# Patient Record
Sex: FEMALE | Race: WHITE
[De-identification: names, ages, dates, MRNs, and addresses within clinical notes are randomized per-mention and may not be internally consistent; named-entity substitution may affect disease eponyms.]

---

## 2023-05-02 ENCOUNTER — HOSPITAL ENCOUNTER (EMERGENCY)
Dept: HOSPITAL 47 - EC | Age: 68
Discharge: HOME | End: 2023-05-02
Payer: MEDICARE

## 2023-05-02 VITALS — HEART RATE: 47 BPM | SYSTOLIC BLOOD PRESSURE: 125 MMHG | TEMPERATURE: 96.4 F | DIASTOLIC BLOOD PRESSURE: 58 MMHG

## 2023-05-02 VITALS — RESPIRATION RATE: 18 BRPM

## 2023-05-02 DIAGNOSIS — M79.604: Primary | ICD-10-CM

## 2023-05-02 PROCEDURE — 73502 X-RAY EXAM HIP UNI 2-3 VIEWS: CPT

## 2023-05-02 PROCEDURE — 99283 EMERGENCY DEPT VISIT LOW MDM: CPT

## 2023-05-02 NOTE — XR
EXAMINATION TYPE: XR knee complete RT

 

DATE OF EXAM: 5/2/2023

 

CLINICAL HISTORY: Pain.

 

TECHNIQUE:  Three views of the right knee are obtained.

 

COMPARISON: None.

 

FINDINGS:  There is no acute fracture/dislocation evident in right knee. Mild-to-moderate tricompartm
ent joint space loss without significant spurring. Mild to moderate posterior arteriovascular calcifi
cation is incidentally noted.  

 

IMPRESSION: As above.

## 2023-05-02 NOTE — XR
EXAMINATION TYPE: XR Hip Complete RT

 

DATE OF EXAM: 5/2/2023

 

CLINICAL HISTORY: Pain.

 

TECHNIQUE:  AP and frogleg views of the right hip are obtained.

 

COMPARISON: None.

 

FINDINGS:  There is no acute fracture/dislocation evident in the right hip. Mild axial joint space lo
ss. Femoral head shape maintained. Mild arterial calcification right groin and pelvic region incident
ally noted.  

 

IMPRESSION: As above. No

## 2023-05-02 NOTE — ED
General Adult HPI





- General


Chief complaint: Extremity Problem,Nontraumatic


Stated complaint: pain in right


Time Seen by Provider: 05/02/23 03:46


Source: patient, family


Mode of arrival: wheelchair





- History of Present Illness


Initial comments: 


Dictation was produced using dragon dictation software. please excuse any 

grammatical, word or spelling errors. 











Chief Complaint: 68-year-old female presents with right lower extremity pain





History of Present Illness: 68-year-old female she states that she has a history

of arthritis.  Patient states that over the last couple a she's been dealing 

with lower extremity pain.  States that yesterday her symptoms seem to be in 

both legs.  She went to bed tonight and felt like she was having mostly right 

lower extremity pain.  States that the pain seems to be in her whole right leg. 

She feels like it, from the knee radiates upwards and also comes from the hip 

and radiates downwards.  Patient took some Motrin and her symptoms improved.  

Patient has any difficulty ambulating.  Denies any numbness in her right lower 

extremity.  Denies any calf tenderness, popliteal tenderness or medial thigh 

pain.  Denies any rash or skin changes.








The ROS documented in this emergency department record has been reviewed and 

confirmed by me.  Those systems with pertinent positive or negative responses 

have been documented in the HPI.  All other systems are other negative and/or 

noncontributory.

















- Related Data


                                    Allergies











Allergy/AdvReac Type Severity Reaction Status Date / Time


 


No Known Allergies Allergy   Verified 05/02/23 04:41














Review of Systems


ROS Statement: 


Those systems with pertinent positive or pertinent negative responses have been 

documented in the HPI.





ROS Other: All systems not noted in ROS Statement are negative.





Past Medical History


Additional Past Medical History / Comment(s): parkinsons.  neuropathy


History of Any Multi-Drug Resistant Organisms: None Reported


Past Surgical History: No Surgical Hx Reported


Past Psychological History: No Psychological Hx Reported


Smoking Status: Never smoker


Past Alcohol Use History: None Reported


Past Drug Use History: None Reported





General Exam





- General Exam Comments


Initial Comments: 











PHYSICAL EXAM:


General Impression: Alert and oriented x3, not in acute distress


HEENT: Normocephalic atraumatic, extra-ocular movements intact, pupils equal and

reactive to light bilaterally, mucous membranes moist.


Cardiovascular: Heart regular rate and rhythm


Chest: Able to complete full sentences, no retractions, no tachypnea


Musculoskeletal: Pulses present and equal in all extremities, no peripheral 

edema


Motor:  no focal deficits noted


Neurological: CN II-XII grossly intact, no focal motor or sensory deficits noted


Skin: Intact with no visualized rashes


Psych: Normal affect and mood


Right lower extremity: Skin is unremarkable.  Normal pulses.  Knee and hip range

with no difficulties








Course


                                   Vital Signs











  05/02/23 05/02/23





  03:59 04:31


 


Temperature 97.9 F 97.9 F


 


Pulse Rate 51 L 48 L


 


Respiratory 17 18





Rate  


 


Blood Pressure 155/69 131/60


 


O2 Sat by Pulse 98 95





Oximetry  














Medical Decision Making





- Medical Decision Making


Was pt. sent in by a medical professional or institution (, PA, NP, urgent 

care, hospital, or nursing home...) When possible be specific


@  -No


Did you speak to anyone other than the patient for history (EMS, parent, family,

police, friend...)? What history was obtained from this source 


@  -No


Did you review nursing and triage notes (agree or disagree)?  Why? 


@  -I reviewed and agree with nursing and triage notes


Were old charts reviewed (outside hosp., previous admission, EMS record, old 

EKG, old radiological studies, urgent care reports/EKG's, nursing home records)?

Report findings 


@  -No old charts were reviewed


Differential Diagnosis (chest pain, altered mental status, abdominal pain women,

abdominal pain men, vaginal bleeding, musculoskeletal, weakness, fever, dyspnea,

syncope, headache, dizziness, GI bleed, back pain, seizure, CVA, palpatations, 

mental health)? 


@  -DVT, PE, cellulitis, arthritis, peripheral arterial disease


EKG interpreted by me (3pts min.).


@  -None done


X-rays interpreted by me (1pt min.).


@  -No acute processes seen on the hip x-ray.  There are incidental findings of 

calcified arteries.  There is a be arthritic changes in the hip and knee.


CT interpreted by me (1pt min.).


@  -None done


U/S interpreted by me (1pt. min.).


@  -None done


What testing was considered but not performed or refused? (CT, X-rays, U/S, 

labs)? Why?


@  -None


What meds were considered but not given or refused? Why?


@  -None


Did you discuss the management of the patient with other professionals 

(professionals i.e. , PA, NP, lab, RT, psych nurse, , , 

teacher, , )? Give summary


@  -No


Was smoking cessation discussed for >3mins.?


@  -No


Was critical care preformed (if so, how long)?


@  -No


Were there social determinants of health that impacted care today? How? 

(Homelessness, low income, unemployed, alcoholism, drug addiction, 

transportation, low edu. Level, literacy, decrease access to med. care, intermediate, 

rehab)?


@  -No


Was there de-escalation of care discussed even if they declined (Discuss DNR or 

withdrawal of care, Hospice)? DNR status


@  -No


What co-morbidities impacted this encounter? (DM, HTN, Smoking, COPD, CAD, 

Cancer, CVA, ARF, Chemo, Hep., AIDS, mental health diagnosis, sleep apnea, 

morbid obesity)?


@  -None


Was patient admitted / discharged? Hospital course, mention meds given and 

route, prescriptions, significant lab abnormalities, going to OR and other 

pertinent info.


@  -68-year-old female presents with right lower extremity pain.  Physical 

examination is benign.  History of present illness and physical examination 

consistent with musculoskeletal pain.  X-rays support arthritis as cause of her 

pain.  Exacerbation reevaluate bedside found with stable medical condition.  

Patient given starter pack of analgesics.  Advised follow-up with primary care 

doctor.


Undiagnosed new problem with uncertain prognosis?


@  -No


Drug Therapy requiring intensive monitoring for toxicity (Heparin, Nitro, 

Insulin, Cardizem)?


@  -No


Were any procedures done?


@  -No


Diagnosis/symptom?  Acute, or Chronic, or Acute on Chronic?  Uncomplicated (wi

thout systemic symptoms) or Complicated (systemic symptoms)?


@  -1.  Acute uncomplicated lower external any pain


Side effects of treatment?


@  -No


Exacerbation, Progression, or Severe Exacerbation?


@  -No


Poses a threat to life or bodily function? How? (Chest pain, USA, MI, pneumonia,

PE, COPD, DKA, ARF, appy, cholecystitis, CVA, Diverticulitis, Homicidal, 

Suicidal, threat to staff... and all critical care pts)


@  -No








Disposition


Clinical Impression: 


 Leg pain





Disposition: HOME SELF-CARE


Condition: Good


Instructions (If sedation given, give patient instructions):  Osteoarthritis 

(ED)


Is patient prescribed a controlled substance at d/c from ED?: No


Referrals: 


Shai Murphy MD [Primary Care Provider] - 1-2 days


Time of Disposition: 06:01

## 2023-06-20 ENCOUNTER — HOSPITAL ENCOUNTER (OUTPATIENT)
Dept: HOSPITAL 47 - ORPAIN | Age: 68
Discharge: HOME | End: 2023-06-20
Attending: ANESTHESIOLOGY
Payer: MEDICARE

## 2023-06-20 VITALS — HEART RATE: 55 BPM | DIASTOLIC BLOOD PRESSURE: 65 MMHG | SYSTOLIC BLOOD PRESSURE: 120 MMHG

## 2023-06-20 VITALS — RESPIRATION RATE: 18 BRPM

## 2023-06-20 VITALS — TEMPERATURE: 97.1 F

## 2023-06-20 VITALS — BODY MASS INDEX: 25.7 KG/M2

## 2023-06-20 DIAGNOSIS — G20: Primary | ICD-10-CM

## 2023-06-20 DIAGNOSIS — M19.90: ICD-10-CM

## 2023-06-20 DIAGNOSIS — F17.210: ICD-10-CM

## 2023-06-20 DIAGNOSIS — Z79.82: ICD-10-CM

## 2023-06-20 DIAGNOSIS — I10: ICD-10-CM

## 2023-06-20 DIAGNOSIS — E78.00: ICD-10-CM

## 2023-06-20 DIAGNOSIS — Z79.899: ICD-10-CM

## 2023-06-20 DIAGNOSIS — Z98.891: ICD-10-CM

## 2023-06-20 LAB
ALT SERPL-CCNC: 14 U/L (ref 4–34)
AST SERPL-CCNC: 26 U/L (ref 14–36)
GLUCOSE CSF-MCNC: 54 MG/DL (ref 40–70)
GLUCOSE SERPL-MCNC: 54 MG/DL (ref 74–99)
NUC CELL # CSF: 0 U/L (ref 0–5)
RBC # CSF: 0 U/L (ref 0–10)
SPECIMEN VOL CSF: 3 ML
T4 FREE SERPL-MCNC: 1.49 NG/DL (ref 0.78–2.19)

## 2023-06-20 PROCEDURE — 88108 CYTOPATH CONCENTRATE TECH: CPT

## 2023-06-20 PROCEDURE — 86038 ANTINUCLEAR ANTIBODIES: CPT

## 2023-06-20 PROCEDURE — 86225 DNA ANTIBODY NATIVE: CPT

## 2023-06-20 PROCEDURE — 82040 ASSAY OF SERUM ALBUMIN: CPT

## 2023-06-20 PROCEDURE — 84443 ASSAY THYROID STIM HORMONE: CPT

## 2023-06-20 PROCEDURE — 62270 DX LMBR SPI PNXR: CPT

## 2023-06-20 PROCEDURE — 86592 SYPHILIS TEST NON-TREP QUAL: CPT

## 2023-06-20 PROCEDURE — 84450 TRANSFERASE (AST) (SGOT): CPT

## 2023-06-20 PROCEDURE — 86431 RHEUMATOID FACTOR QUANT: CPT

## 2023-06-20 PROCEDURE — 83916 OLIGOCLONAL BANDS: CPT

## 2023-06-20 PROCEDURE — 82784 ASSAY IGA/IGD/IGG/IGM EACH: CPT

## 2023-06-20 PROCEDURE — 83873 ASSAY OF CSF PROTEIN: CPT

## 2023-06-20 PROCEDURE — 89050 BODY FLUID CELL COUNT: CPT

## 2023-06-20 PROCEDURE — 86235 NUCLEAR ANTIGEN ANTIBODY: CPT

## 2023-06-20 PROCEDURE — 84460 ALANINE AMINO (ALT) (SGPT): CPT

## 2023-06-20 PROCEDURE — 82945 GLUCOSE OTHER FLUID: CPT

## 2023-06-20 PROCEDURE — 88305 TISSUE EXAM BY PATHOLOGIST: CPT

## 2023-06-20 PROCEDURE — 84157 ASSAY OF PROTEIN OTHER: CPT

## 2023-06-20 PROCEDURE — 84439 ASSAY OF FREE THYROXINE: CPT

## 2023-06-20 PROCEDURE — 82947 ASSAY GLUCOSE BLOOD QUANT: CPT

## 2023-06-20 PROCEDURE — 82042 OTHER SOURCE ALBUMIN QUAN EA: CPT

## 2023-06-20 NOTE — P.PCN
Date of Procedure: 06/20/23


Description of Procedure: 


Procedure: 


1. Lumbar puncture for CSF collection








PREOPERATIVE DIAGNOSIS: MRI of the brain changes, and rule out multiple 

sclerosis 





POSTOPERATIVE DIAGNOSIS: MRI of the brain changes, and rule out multiple 

sclerosis 





SURGEON: Kelley Whitman 





ANESTHESIA:  Local with 1% lidocaine, and IV sedation : Versed 2 MG, and 50 g 

of fentanyl


Sedation supervision timings: 5714-4986 





EBL: None.





Specimen removed: 3 mL of CSF in each collecting tube X4








PROCEDURE INDICATION:  The patient had history of Parkinson's disease, and 

changes in her brain white matter in MRI.  Her neurologist requested for CSF 

analysis to rule out multiple sclerosis. .  Consulted for lumbar puncture for 

CSF collection send it for analysis. 





PROCEDURE DESCRIPTION: The patient was seen and identified.  Risks, benefits, 

complications, and alternatives were discussed with the patient. The patient 

agreed to proceed with the procedure and signed the consent, and vital signs 

were stable. 





Patient was taken to the procedure area, and time out was completed. The patient

was placed in sitting  position on procedure.  . The lumbosacral area was 

prepped and draped in the usual sterile fashion. Critical pause was taken. Vital

signs were closely monitored during the procedure.





Posterior superior iliac crest landmarks was identified .  The midline lumbar 

space also identified.  Approximately at the level of L4-L5 interspinous space, 

skin and deeper tissues were localized with 3 mL of 1% lidocaine.   Using a 22  

gauge 3.5 spinal needle entered into subarachnoid space, with 1 attempt.  Clear 

CSF came out of the spinal needle.  3 mL of CSF fluid collected in each tube 4.

 Needle was withdrawn intact, skin was cleansed, and bandages were applied. 





COMPLICATIONS: None.





DISPOSITION / PLANS: The patient was placed in a supine position and transferred

to the recovery area in a stable condition for observation. Patient was 

discharged from the recovery room after meeting discharge criteria. Home 

discharge instructions given to the patient by the staff. The patient was 

reexamined prior to discharge.

## 2023-06-21 LAB
DSDNA AB TITR SER: <1 IU/ML
RHEUMATOID FACT SERPL-ACNC: <15 IU/ML (ref 0–15)

## 2023-06-23 LAB — VDRL CSF-TITR: (no result) TITER

## 2023-06-27 ENCOUNTER — HOSPITAL ENCOUNTER (OUTPATIENT)
Dept: HOSPITAL 47 - LABWHC1 | Age: 68
Discharge: HOME | End: 2023-06-27
Attending: PSYCHIATRY & NEUROLOGY
Payer: MEDICARE

## 2023-06-27 DIAGNOSIS — R20.2: Primary | ICD-10-CM

## 2023-06-27 DIAGNOSIS — R93.0: ICD-10-CM

## 2023-06-27 PROCEDURE — 85730 THROMBOPLASTIN TIME PARTIAL: CPT

## 2023-06-27 PROCEDURE — 36415 COLL VENOUS BLD VENIPUNCTURE: CPT

## 2023-06-27 PROCEDURE — 85613 RUSSELL VIPER VENOM DILUTED: CPT

## 2023-06-28 LAB
APTT BLD: 46 SEC(S) (ref ?–43)
SCREEN DRVVT: 40 SEC(S) (ref ?–43)
SCREEN DRVVT: 40 SEC(S) (ref ?–44)

## 2025-03-03 NOTE — MR
EXAMINATION TYPE: MR cervical spine wo con

 

DATE OF EXAM: 3/3/2025 5:59 PM

 

COMPARISON: None.

 

CLINICAL INDICATION: Female, 70 years old with history of M54.2 CERVICALGIA; PHH, Neck pain/weakness 
into both arms/fingers x2 years

 

TECHNIQUE: Multi planar, multi sequence imaging was performed utilizing: T1-weighted, T2-weighted, an
d turbo inversion recovery imaging of the cervical spine. 

IV Contrast: mL (None, if empty)

 

FINDINGS: 

Alignment: The cervical vertebral bodies have preserved heights. Alignment is within normal limits gi
jerad patient positioning. 

 

Bones: Scattered Modic endplate changes with osteophytes and disc space narrowing. Multilevel degener
ative disc disease is noted and most pronounced at the C5-C7 vertebral levels.

 

Cord: The spinal cord is unremarkable with regards to their signal intensity and morphology. 

 

Discs:  Intervertebral disc signal is maintained.

 

C2-C3: No significant disc pathology. The spinal canal is patent.  No neural foraminal stenosis.

 

C3-C4: A disc osteophyte complex is present with mild to moderate spinal canal stenosis.  Bilateral f
acet and uncovertebral joint arthropathy are present with moderate bilateral neural foraminal stenosi
s.

 

C4-C5: A disc osteophyte complex is present with mild spinal canal stenosis.  Bilateral facet and unc
overtebral joint arthropathy are present with moderate to severe right and mild left neural foraminal
 stenosis.

 

C5-C6: A disc osteophyte complex is present with mild spinal canal stenosis.  Bilateral facet and unc
overtebral joint arthropathy are present with moderate to severe left and mild right neural foraminal
 stenosis.

 

C6-C7: A disc osteophyte complex is present with mild spinal canal stenosis.  Bilateral facet and unc
overtebral joint arthropathy are present with mild bilateral neural foraminal stenosis.

 

C7-T1: No significant disc pathology. The spinal canal is patent.  No neural foraminal stenosis.

 

Other: None.

 

IMPRESSION:

1. No evidence for disc herniation or significant spinal canal stenosis.

 

2. Moderate disc degeneration with associated osteoarthritic changes. No foraminal stenosis throughou
t the multiple levels in the spine as described above.

 

[facility], Workstation: [id number], 3/3/2025 6:28 PM